# Patient Record
Sex: MALE | Race: OTHER
[De-identification: names, ages, dates, MRNs, and addresses within clinical notes are randomized per-mention and may not be internally consistent; named-entity substitution may affect disease eponyms.]

---

## 2020-09-24 ENCOUNTER — HOSPITAL ENCOUNTER (EMERGENCY)
Dept: HOSPITAL 11 - JP.ED | Age: 20
Discharge: HOME | End: 2020-09-24
Payer: SELF-PAY

## 2020-09-24 DIAGNOSIS — S05.01XA: Primary | ICD-10-CM

## 2020-09-24 DIAGNOSIS — J45.909: ICD-10-CM
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
## 2020-09-24 PROCEDURE — 99283 EMERGENCY DEPT VISIT LOW MDM: CPT

## 2020-09-24 NOTE — EDM.PDOC
ED HPI GENERAL MEDICAL PROBLEM





- General


Chief Complaint: Eye Problems


Stated Complaint: R EYE IRRITATION


Time Seen by Provider: 09/24/20 21:33


Source of Information: Reports: Patient


History Limitations: Reports: No Limitations





- History of Present Illness


INITIAL COMMENTS - FREE TEXT/NARRATIVE: 





PT HAS POOR VISION AND KNOWS THAT HE NEEDS GLASSES. hE  INJURED HIS EYE 

YESTERDAY AND HE HAS HAD PAIN IN THE EYE SINCE THAT TIME. 


Onset: Other ( STARTED YESTERDAY. )


Duration: Hour(s):


Location: Reports: Face


Associated Symptoms: Reports: No Other Symptoms


  ** Right Eye


Pain Score (Numeric/FACES): 7





- Related Data


                                    Allergies











Allergy/AdvReac Type Severity Reaction Status Date / Time


 


No Known Allergies Allergy   Verified 09/24/20 20:55











Home Meds: 


                                    Home Meds





NK [No Known Home Meds]  09/24/20 [History]











Past Medical History


Respiratory History: Reports: Asthma


Psychiatric History: Reports: Anxiety





Social & Family History





- Tobacco Use


Smoking Status *Q: Never Smoker


Second Hand Smoke Exposure: No





- Caffeine Use


Caffeine Use: Reports: Coffee, Tea





- Recreational Drug Use


Recreational Drug Use: No





ED ROS GENERAL





- Review of Systems


Review Of Systems: See Below


Constitutional: Reports: No Symptoms


HEENT: Reports: Eye Pain


Respiratory: Reports: No Symptoms


Cardiovascular: Reports: No Symptoms


Endocrine: Reports: No Symptoms


GI/Abdominal: Reports: No Symptoms





ED EXAM GENERAL W FULL EYE





- Physical Exam


Exam: See Below


Text/Narrative:: 





PT WAS ON HIS PHONE AND HE SUDDENLY FELT LIKE HE HAD SOMETHING IN HIS EYE. hE 

STATED THIS STARTED LAST NITE AND HAS GOTTEN WORSE. hE KNOWS HIS VISION IS BAD 

AND HE NEEDS GLASSES. 


Exam Limited By: No Limitations


General Appearance: Alert, Other (PUPILS ARE EQUAL AND REACTIVE. hE HAS A 

MARKEDLY INJECTED RT EYE. hE HAS NO VISABLE FOREIGN BODY IN THE EYE, tETRACAINE 

WAS INSERTED IN  THE EYE. iT WAS STAINED AND HE WAS FOUND TO HAVE A CORNEAL 

ABRASION ON THE UPPER CORNEA)


Cornea Exam: Right: Corneal Abrasion ( tHIS IS OF MODERATE DEPTH)


Ears: Normal TMs


Nose: Normal Inspection


Throat/Mouth: Normal Inspection


Head: Atraumatic


Neck: Normal Inspection





Course





- Vital Signs


Last Recorded V/S: 


                                Last Vital Signs











Temp  36.7 C   09/24/20 21:00


 


Pulse  65   09/24/20 21:00


 


Resp  16   09/24/20 21:00


 


BP  109/69   09/24/20 21:00


 


Pulse Ox  100   09/24/20 21:00














- Orders/Labs/Meds


Meds: 


Medications














Discontinued Medications














Generic Name Dose Route Start Last Admin





  Trade Name Warren  PRN Reason Stop Dose Admin


 


Gentamicin Sulfate  1 gm  09/24/20 21:45  09/24/20 21:55





  Gentak 0.3% Ophth Oint  EYERT  09/24/20 21:46  1 gm





  TID ONE   Administration


 


Tetracaine HCl  1 ml  09/24/20 21:32  09/24/20 21:46





  Tetracaine 0.5% Steri-Unit Sol  EYELF  09/24/20 21:33  1 ml





  ASDIRECTED ONE   Administration














- Re-Assessments/Exams


Free Text/Narrative Re-Assessment/Exam: 





09/24/20 21:51


 THE EYE WAS PATCHED WITH TETRACAINE AND GENTAMYCIN OINTMENT. 





Departure





- Departure


Time of Disposition: 21:51


Disposition: Home, Self-Care 01


Condition: Fair


Clinical Impression: 


 Corneal abrasion








- Discharge Information


Instructions:  Corneal Abrasion, Easy-to-Read


Referrals: 


PCP,None [Primary Care Provider] - 


Forms:  ED Department Discharge


Care Plan Goals: 


LEAVE THE PATCH ON UNTIL TOMORROW AFTERNOON, WHEN THE PATCH IS OFF USE THE DROPS

WHICH YOU WILL FILL AT A PHARMACY. USE DARK GLASSES TO AVOID THE LITE EXPOSURE, 

cONSIDER GETTING GLASSES. 





Sepsis Event Note (ED)





- Evaluation


Sepsis Screening Result: No Definite Risk